# Patient Record
Sex: FEMALE | Race: WHITE | NOT HISPANIC OR LATINO | URBAN - METROPOLITAN AREA
[De-identification: names, ages, dates, MRNs, and addresses within clinical notes are randomized per-mention and may not be internally consistent; named-entity substitution may affect disease eponyms.]

---

## 2021-09-27 ENCOUNTER — HOSPITAL ENCOUNTER (OUTPATIENT)
Facility: CLINIC | Age: 21
Discharge: STILL ADMITTED OR ADMINISTRATIVE | End: 2021-09-27
Payer: COMMERCIAL

## 2022-12-27 ENCOUNTER — HOSPITAL ENCOUNTER (OUTPATIENT)
Facility: CLINIC | Age: 22
Discharge: HOME | End: 2022-12-27
Attending: HOSPITALIST
Payer: COMMERCIAL

## 2022-12-27 VITALS
TEMPERATURE: 97.9 F | HEART RATE: 97 BPM | OXYGEN SATURATION: 97 % | DIASTOLIC BLOOD PRESSURE: 79 MMHG | SYSTOLIC BLOOD PRESSURE: 118 MMHG

## 2022-12-27 DIAGNOSIS — N39.0 UTI (URINARY TRACT INFECTION) WITH PYURIA: Primary | ICD-10-CM

## 2022-12-27 LAB
BILIRUBIN, POC: NEGATIVE
BLOOD URINE, POC: ABNORMAL
CLARITY, POC: ABNORMAL
COLOR, POC: YELLOW
EXPIRATION DATE: ABNORMAL
EXPIRATION DATE: NORMAL
GLUCOSE URINE, POC: NEGATIVE
KETONES, POC: NEGATIVE
LEUKOCYTE EST, POC: ABNORMAL
Lab: ABNORMAL
Lab: NORMAL
NITRITE, POC: ABNORMAL
PH, POC: 6
POCT MANUFACTURER: ABNORMAL
POCT MANUFACTURER: NORMAL
PREGNANCY TEST URINE, POC: NEGATIVE
PROTEIN, POC: NEGATIVE
SPECIFIC GRAVITY, POC: 1.01
UROBILINOGEN, POC: 0.2

## 2022-12-27 PROCEDURE — 81025 URINE PREGNANCY TEST: CPT | Performed by: HOSPITALIST

## 2022-12-27 PROCEDURE — S9083 URGENT CARE CENTER GLOBAL: HCPCS | Performed by: HOSPITALIST

## 2022-12-27 PROCEDURE — 99213 OFFICE O/P EST LOW 20 MIN: CPT | Performed by: HOSPITALIST

## 2022-12-27 PROCEDURE — 87077 CULTURE AEROBIC IDENTIFY: CPT | Performed by: HOSPITALIST

## 2022-12-27 PROCEDURE — 87491 CHLMYD TRACH DNA AMP PROBE: CPT | Performed by: HOSPITALIST

## 2022-12-27 PROCEDURE — 81002 URINALYSIS NONAUTO W/O SCOPE: CPT | Performed by: HOSPITALIST

## 2022-12-27 RX ORDER — SULFAMETHOXAZOLE AND TRIMETHOPRIM 800; 160 MG/1; MG/1
1 TABLET ORAL 2 TIMES DAILY
Qty: 10 TABLET | Refills: 0 | Status: SHIPPED | OUTPATIENT
Start: 2022-12-27 | End: 2023-01-01

## 2022-12-27 NOTE — ED PROVIDER NOTES
History  Chief Complaint   Patient presents with   • Urinary Frequency     HPI     22-year-old female was last menstrual cycle was 1 week ago.  Since over the past 6 weeks patient has been having urinary symptoms mostly frequency pressure.  Patient has been drinking lots of fluid.  However over the past 3 days patient's symptoms return with back pain no fever no chills.  Patient is sexually active denies any discharge at this time    No past medical history on file.    No past surgical history on file.    No family history on file.         Review of Systems  Constitutional well developed well nourished no fever no chills  HEENT: No visual changes no hearing changes no throat pain no jaw pain  Cardiovascular: No chest pain no palpitation  Respiratory: No shortness of breath no diaphoresis no PND  Gastrointestinal: No abdominal pain no constipation no diarrhea no nausea vomiting no weight loss  Genitourinary:has  urinary frequency has dysuria no hematuria  Musculoskeletal: No neck pain has back pain   Neuro: No dizziness no headache no ataxia  Physical Exam  ED Triage Vitals [12/27/22 1205]   Temp Heart Rate Resp BP SpO2   36.6 °C (97.9 °F) 97 -- 118/79 97 %      Temp Source Heart Rate Source Patient Position BP Location FiO2 (%) (Set)   Oral -- Sitting Left upper arm --       Physical Exam    HEENT PE RRL, no nystagmus, no icteric sclerae, good EOM  Heart RRR S1-S2 no murmurs  Lungs CTA no wheezing no rales no retractions no rhonchi  Extremities good pulses, no edema, good range of motion,  Abdomen  suprapubic tenderness negative for CVA tenderness bowel sounds present  Procedures  Procedures  Urinalysis showed UTI  UC Course     UTI  MDM      Discharged home good and stable  Bactrim 1 tablet twice a day for 5 days  Drink lots of fluid  Encourage proper perineal hygiene  Follow-up with Dr. Fountain if no better in 1 week       Phi Llamas MD  12/27/22 1236       Phi Llamas MD  12/27/22 131

## 2022-12-27 NOTE — DISCHARGE INSTRUCTIONS
Bactrim 1 tablet twice a day for 5 days  Drink lots of fluid  Encourage proper perineal hygiene  Follow-up with Dr. Fountain if no better in 1 week

## 2022-12-28 LAB
C TRACH RRNA SPEC QL NAA+PROBE: NEGATIVE
N GONORRHOEA RRNA SPEC QL NAA+PROBE: NEGATIVE

## 2022-12-29 LAB
BACTERIA UR CULT: ABNORMAL
BACTERIA UR CULT: ABNORMAL

## 2022-12-29 NOTE — RESULT ENCOUNTER NOTE
12/28/22 9:00 PM  Spoke with pt via cell, relayed negative gonorrhea & chlamydia results. Also discused positive urine culture (>=100,000 cfu/mL E.coli), Rx Bactrim DS 1 tablet BID x 5 days which pt is taking and tolerating well, reports improvement of symptoms. F/U per d/c instructions and urged to seek medical attention for new/worsening/persistent symptoms.

## 2023-01-26 ENCOUNTER — HOSPITAL ENCOUNTER (OUTPATIENT)
Facility: CLINIC | Age: 23
Discharge: HOME | End: 2023-01-26
Attending: INTERNAL MEDICINE
Payer: COMMERCIAL

## 2023-01-26 VITALS
DIASTOLIC BLOOD PRESSURE: 74 MMHG | RESPIRATION RATE: 18 BRPM | OXYGEN SATURATION: 96 % | SYSTOLIC BLOOD PRESSURE: 122 MMHG | TEMPERATURE: 98.6 F | HEART RATE: 82 BPM

## 2023-01-26 DIAGNOSIS — R39.9 UTI SYMPTOMS: Primary | ICD-10-CM

## 2023-01-26 LAB
BACTERIA, POC: 0
BILIRUBIN, POC: NEGATIVE
BLOOD URINE, POC: ABNORMAL
CLARITY, POC: ABNORMAL
COLOR, POC: YELLOW
EXPIRATION DATE: ABNORMAL
EXPIRATION DATE: NORMAL
GLUCOSE URINE, POC: NEGATIVE
KETONES, POC: POSITIVE
LEUKOCYTE EST, POC: ABNORMAL
Lab: ABNORMAL
Lab: NORMAL
NITRITE, POC: NEGATIVE
PH, POC: 5
POCT MANUFACTURER: ABNORMAL
POCT MANUFACTURER: NORMAL
PREGNANCY TEST URINE, POC: NEGATIVE
PROTEIN, POC: ABNORMAL
SPECIFIC GRAVITY, POC: 1.01
UROBILINOGEN, POC: 0.2

## 2023-01-26 PROCEDURE — 99213 OFFICE O/P EST LOW 20 MIN: CPT | Performed by: NURSE PRACTITIONER

## 2023-01-26 PROCEDURE — S9083 URGENT CARE CENTER GLOBAL: HCPCS | Performed by: NURSE PRACTITIONER

## 2023-01-26 PROCEDURE — 81025 URINE PREGNANCY TEST: CPT | Performed by: NURSE PRACTITIONER

## 2023-01-26 PROCEDURE — 81002 URINALYSIS NONAUTO W/O SCOPE: CPT | Performed by: NURSE PRACTITIONER

## 2023-01-26 RX ORDER — CEFUROXIME AXETIL 250 MG/1
250 TABLET ORAL 2 TIMES DAILY
Qty: 20 TABLET | Refills: 0 | Status: SHIPPED | OUTPATIENT
Start: 2023-01-26 | End: 2023-02-05

## 2023-01-26 NOTE — ED PROVIDER NOTES
[facility]  Urgent Care, Occupational Health, & Travel Medicine  Emergency Medicine/Urgent Care Note  Mya Velez, BOB, CRNP, FNP-BC  Nurse Practitioner    HISTORY OF PRESENT ILLNESS     Patient presenting today for:    UTI symptoms.  • Patient reports frequency, urgency, burning w/ urination - she just had a UTI in 12/2022 and was treated with 5-day course of Bactrim DS. She states s/s resolved but then returned shortly after - less bothersome in regards to severity.   • States she took the full course of antibiotic without any missed doses.     Denies:  • Abdominal pain, pelvic pain, suprapubic pain.   • Drainage or discharge from urethra or vaginal areas.   • Hematuria or urine discoloration.   • Flank pain or back pain.  • Malodorous urine.   • Fever, chills, body aches.   • Systemic s/s of infection.   • N/v/d/c, bowel pattern changes.    OTHER INFO:  • The patient has not used anything for treatment so far.   • No chance of pregnancy.   • Reports no chance of STI/STD.         PAST HISTORY     Reviewed from Nursing Triage:         No past medical history on file.    No past surgical history on file.    No family history on file.          REVIEW OF SYSTEMS     • Constitutional: No fever, chills. No aches. No fatigue. No systemic s/s of infection.  • Respiratory: No cough, SOB, HASTINGS, wheezing.   • Cardiovascular: No CP, palpitations, edema, activity intolerance.  • Gastrointestinal: No abdominal pain, bowel pattern changes, n/v/d/c. No GERD at this time.  • Genitourinary: + frequency, urgency, burning.  • Musculoskeletal: Denies body aches/myalgias. No back pain/flank pain.   • Skin: No associated skin changes.  • Hematological: Negative for adenopathy. No bldg noted in urine.        VITALS     ED Vitals    Date/Time Temp Pulse Resp BP SpO2 [facility]   01/26/23 1122 37 °C (98.6 °F) 82 18 122/74 96 % MM             PHYSICAL EXAM     General  • NAD. Appears comfortable, relaxed. No acute pain. Well-groomed. BMI  noted. See VS/Trends.   • No fever, chills, tremors.    Chest/Respiratory  • No SOB/HASTINGS. POX remains stable on RA. RR WNL.     Cardiovascular  • AVSS. No CP, palpitations, SOB, HASTINGS. NO EDEMA.     GI/  • No abdominal pain. No guarding. No rigidity.   • No flank pain.   • No pelvic pain.   • ABS x 4 quads. Abdomen is soft and nontender. Non-distended.   • URINE POCT: See A/P.     MSK  • No flank pain or back pain on exam/palpation.     Skin/Integ  • No bruising, erythema, rashes on the flank. No rashes or dry skin.        PROCEDURES     Procedures     DATA     Results     None          No orders to display       Scoring tools                                    MDM / ED COURSE / CLINICAL IMPRESSION / DISPO     Clinical Impression      UTI symptoms       MDM:    See HPI/ROS/PE.   · UA POCT done. Indicative of possible or suspected UTI.   · Will send urine sample out for C&S. Based on susceptibility chart/urine culture results, regimen will be altered if/when needed.   · RX: Ceftin 250 mg BID x 10 days (opted for longer regimen this time). Patient knows to take each dose with food and if she feels her symptoms have fully resolved by day 7, she will d/c antibiotic use at that time.   · Recommended thorough hand hygiene, perineal care/hygiene, and monitoring for more acute and dangerous s/s (I.e. Fever, chills, myalgias, flank pain, abdominal pain, nausea, vomiting, etc).  · Reviewed red-flag symptoms and indications to present to the ER (for worsening) or PCP (for lack of improvement, worsening, and general follow-up) for further evaluation.    · Knows to follow-up with PCP as well due to persistent nature of UTI symptoms despite antibiotic use.         Disposition  Discharge. Strict return precautions reviewed.              Mya Velez, BOB  01/26/23 1482

## 2023-01-26 NOTE — ED ATTESTATION NOTE
I was immediately available to provide supervision and direction for the care of the patient.     Mckay Kunz DO  01/26/23 151

## 2023-01-28 LAB
BACTERIA UR CULT: NO GROWTH
BACTERIA UR CULT: NORMAL

## 2023-03-20 ENCOUNTER — OFFICE VISIT (OUTPATIENT)
Dept: OBSTETRICS AND GYNECOLOGY | Facility: CLINIC | Age: 23
End: 2023-03-20
Payer: COMMERCIAL

## 2023-03-20 VITALS
SYSTOLIC BLOOD PRESSURE: 118 MMHG | WEIGHT: 145.6 LBS | BODY MASS INDEX: 23.4 KG/M2 | DIASTOLIC BLOOD PRESSURE: 68 MMHG | HEIGHT: 66 IN

## 2023-03-20 DIAGNOSIS — N92.0 MENORRHAGIA WITH REGULAR CYCLE: ICD-10-CM

## 2023-03-20 DIAGNOSIS — N94.6 DYSMENORRHEA: ICD-10-CM

## 2023-03-20 DIAGNOSIS — Z12.4 ENCOUNTER FOR PAP SMEAR OF CERVIX WITH HPV DNA COTESTING: ICD-10-CM

## 2023-03-20 DIAGNOSIS — Z01.419 WOMEN'S ANNUAL ROUTINE GYNECOLOGICAL EXAMINATION: Primary | ICD-10-CM

## 2023-03-20 PROBLEM — J45.990 EXERCISE-INDUCED ASTHMA: Status: ACTIVE | Noted: 2023-03-20

## 2023-03-20 PROCEDURE — S0610 ANNUAL GYNECOLOGICAL EXAMINA: HCPCS | Performed by: OBSTETRICS & GYNECOLOGY

## 2023-03-20 RX ORDER — ALBUTEROL SULFATE 90 UG/1
2 INHALANT RESPIRATORY (INHALATION) EVERY 6 HOURS PRN
COMMUNITY

## 2023-03-20 RX ORDER — NORGESTIMATE AND ETHINYL ESTRADIOL 7DAYSX3 LO
1 KIT ORAL DAILY
Qty: 84 TABLET | Refills: 3 | Status: SHIPPED | OUTPATIENT
Start: 2023-03-20 | End: 2023-09-25 | Stop reason: ALTCHOICE

## 2023-03-20 ASSESSMENT — ENCOUNTER SYMPTOMS
CHEST TIGHTNESS: 0
PHOTOPHOBIA: 0
HEMATOLOGIC/LYMPHATIC NEGATIVE: 1
FLANK PAIN: 0
GASTROINTESTINAL NEGATIVE: 1
HEADACHES: 0
PALPITATIONS: 0
DECREASED CONCENTRATION: 0
WHEEZING: 0
SLEEP DISTURBANCE: 0
BACK PAIN: 0
SORE THROAT: 0
COUGH: 0
DIFFICULTY URINATING: 0
DEPRESSION: 0
WEAKNESS: 0
SHORTNESS OF BREATH: 0
ALLERGIC/IMMUNOLOGIC NEGATIVE: 1
TROUBLE SWALLOWING: 0
CONSTITUTIONAL NEGATIVE: 1
JOINT SWELLING: 0
LIGHT-HEADEDNESS: 0
DYSURIA: 0
SINUS PRESSURE: 0
ENDOCRINE NEGATIVE: 1
HEMATURIA: 0
NERVOUS/ANXIOUS: 0
EYE REDNESS: 0
MYALGIAS: 0
FREQUENCY: 0

## 2023-03-20 NOTE — PROGRESS NOTES
"3/20/2023  Nayeli Rosalinda is a 23 y.o. female who presents for annual exam.   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 Dysmenorrhea:mild, occurring first 1-2 days of flow. Cyclic symptoms include none. No intermenstrual bleeding, spotting, or discharge.  First 2-3 days are heavy and changes tampon every 2-3 hours  Takes Ibuprofen for cramps; takes 400 mg every   4-6 hours     The patient is sexually active. GYN screening history: no prior history of gyn screening tests. Domestic violence: no.     Current contraception: condoms  History of abnormal Pap smear: no  Family history of uterine or ovarian cancer: no  Regular self breast exam: no  History of abnormal mammogram: no  Family history of breast cancer: no  History of abnormal lipids: no  Menstrual History:  OB History        0    Para   0    Term   0       0    AB   0    Living   0       SAB   0    IAB   0    Ectopic   0    Multiple   0    Live Births   0                Patient's last menstrual period was 2023 (approximate).         Review of Systems and Physical Exam  The following have been reviewed and updated as appropriate in this visit:  Allergies  Meds  Problems       Visit Vitals  /68   Ht 1.676 m (5' 6\")   Wt 66 kg (145 lb 9.6 oz)   LMP 2023 (Approximate)   BMI 23.50 kg/m²     HPI  Review of Systems   Constitutional: Negative.    HENT: Negative for sinus pressure, sneezing, sore throat and trouble swallowing.    Eyes: Negative for photophobia, redness and visual disturbance.   Respiratory: Negative for cough, chest tightness, shortness of breath and wheezing.    Cardiovascular: Negative for chest pain, palpitations and leg swelling.   Gastrointestinal: Negative.    Endocrine: Negative.    Genitourinary: Negative for decreased urine volume, difficulty urinating, dyspareunia, " dysuria, flank pain, frequency, genital sores, hematuria, irregular menses, nocturia, pain with intercourse, pelvic pain, sexual dysfunction, urgency, vaginal bleeding, vaginal discharge, vaginal itching and vaginal pain.   Breast: Negative.   Musculoskeletal: Negative for back pain, joint swelling and myalgias.   Skin: Negative.    Allergic/Immunologic: Negative.    Neurological: Negative for weakness, light-headedness and headaches.   Hematological: Negative.    Psychiatric/Behavioral: Negative for decreased concentration, depression, sleep disturbance and suicidal ideas. The patient is not nervous/anxious.        Physical Exam  Constitutional:       General: She is not in acute distress.     Appearance: Normal appearance. She is well-developed. She is not diaphoretic.   Genitourinary:      Pelvic exam was performed with patient in the lithotomy position.      Urethra, vagina, cervix, uterus and urethral meatus normal.      No labial rash or lesion.      There is no tenderness, injury, Bartholin's cyst or lesion on the right labia.      There is no tenderness, injury or Bartholin's cyst on the left labia.    No urethral prolapse, tenderness, diverticulum or discharge present.      No vaginal discharge.      Uterus is anteverted and regular.   Pelvic exam was performed with patient in lithotomy exam position.     External female genitalia normal. No signs of erythema or atrophy. There is no right labia majora lesion or labia minora lesion. There is no left labia majora lesion or labia minora lesion. There is no lesion, edema, tenderness, injury or Bartholin's cyst on the right external genitalia. There is no edema, tenderness, injury or Bartholin's cyst on the left external genitalia.   Urethral meatus normal. There is no urethral meatus prolapse, lesion, tenderness or bleeding.   Urethra normal. There is no urethral discharge, erythema, tenderness, urethrocele or urethral diverticulum. The urethra is not everted.  [place]'s glands normal.   Normal bladder.   Vagina normal.   Cervix exam normal.  Uterus is normal. Uterine contour is regular. Uterus is anteverted.   Adnexa normal. HENT:      Head: Normocephalic and atraumatic.      Nose: Nose normal.   Eyes:      General: No scleral icterus.        Right eye: No discharge.         Left eye: No discharge.      Conjunctiva/sclera: Conjunctivae normal.      Pupils: Pupils are equal, round, and reactive to light.   Neck:      Thyroid: No thyromegaly.   Cardiovascular:      Rate and Rhythm: Normal rate and regular rhythm.      Heart sounds: Normal heart sounds. No murmur heard.     No friction rub. No gallop.   Pulmonary:      Effort: Pulmonary effort is normal. No respiratory distress.      Breath sounds: Normal breath sounds. No wheezing or rales.   Chest:      Chest wall: No tenderness.   Breasts:     Breasts are symmetrical.      Right: Normal. No inverted nipple, mass, nipple discharge, skin change or tenderness.      Left: Normal. No inverted nipple, mass, nipple discharge, skin change or tenderness.   Abdominal:      General: Bowel sounds are normal. There is no distension.      Palpations: Abdomen is soft. There is no mass.      Tenderness: There is no abdominal tenderness. There is no right CVA tenderness, left CVA tenderness, guarding or rebound.      Hernia: No hernia is present.   Musculoskeletal:         General: No tenderness or deformity. Normal range of motion.      Cervical back: Normal range of motion and neck supple.   Lymphadenopathy:      Cervical: No cervical adenopathy.      Upper Body:      Right upper body: No supraclavicular, axillary or pectoral adenopathy.      Left upper body: No supraclavicular, axillary or pectoral adenopathy.   Neurological:      Mental Status: She is alert and oriented to person, place, and time.      Cranial Nerves: No cranial nerve deficit.   Skin:     General: Skin is warm and dry.      Coloration: Skin is not pale.      Findings:  No erythema or rash.   Psychiatric:         Behavior: Behavior normal.   Vitals reviewed.          Diagnoses and all orders for this visit:    Women's annual routine gynecological examination    Encounter for Pap smear of cervix with HPV DNA cotesting  -     Cytology, Thinprep Pap    Dysmenorrhea  -     norgestimate-ethinyl estradioL (ORTHO TRI-CYCLEN LO) 0.18/0.215/0.25 mg-25 mcg per tablet; Take 1 tablet by mouth daily.    Menorrhagia with regular cycle  -     norgestimate-ethinyl estradioL (ORTHO TRI-CYCLEN LO) 0.18/0.215/0.25 mg-25 mcg per tablet; Take 1 tablet by mouth daily.    .counseled how to start and use OCPs as well as risks and benefits  Await pap smear results..  Return in about 3 months (around 6/20/2023) for medication follow up.  Gina Forman MD

## 2023-03-23 LAB
C TRACH RRNA CVX QL NAA+PROBE: NEGATIVE
CYTOLOGIST CVX/VAG CYTO: NORMAL
CYTOLOGY CVX/VAG DOC CYTO: NORMAL
CYTOLOGY CVX/VAG DOC THIN PREP: NORMAL
DX ICD CODE: NORMAL
LAB CORP .: NORMAL
LAB CORP NOTE:: NORMAL
N GONORRHOEA RRNA CVX QL NAA+PROBE: NEGATIVE
OTHER STN SPEC: NORMAL
STAT OF ADQ CVX/VAG CYTO-IMP: NORMAL

## 2023-03-24 NOTE — RESULT ENCOUNTER NOTE
Pap smear reviewed and is normal ; cultures for gonorrhea and chlamydia are negative  Portal message sent

## 2023-06-22 ENCOUNTER — OFFICE VISIT (OUTPATIENT)
Dept: OBSTETRICS AND GYNECOLOGY | Facility: CLINIC | Age: 23
End: 2023-06-22
Payer: COMMERCIAL

## 2023-06-22 VITALS
WEIGHT: 145.4 LBS | SYSTOLIC BLOOD PRESSURE: 100 MMHG | BODY MASS INDEX: 23.37 KG/M2 | DIASTOLIC BLOOD PRESSURE: 80 MMHG | HEIGHT: 66 IN

## 2023-06-22 DIAGNOSIS — N89.8 VAGINAL DISCHARGE: ICD-10-CM

## 2023-06-22 DIAGNOSIS — B37.31 CANDIDIASIS OF VULVA: Primary | ICD-10-CM

## 2023-06-22 DIAGNOSIS — Z20.2 POSSIBLE EXPOSURE TO STD: ICD-10-CM

## 2023-06-22 DIAGNOSIS — N76.0 ACUTE VAGINITIS: ICD-10-CM

## 2023-06-22 PROCEDURE — 99214 OFFICE O/P EST MOD 30 MIN: CPT | Performed by: OBSTETRICS & GYNECOLOGY

## 2023-06-22 PROCEDURE — 3008F BODY MASS INDEX DOCD: CPT | Performed by: OBSTETRICS & GYNECOLOGY

## 2023-06-22 RX ORDER — FLUCONAZOLE 150 MG/1
TABLET ORAL
Qty: 2 TABLET | Refills: 1 | Status: SHIPPED | OUTPATIENT
Start: 2023-06-22

## 2023-06-22 RX ORDER — CLOTRIMAZOLE AND BETAMETHASONE DIPROPIONATE 10; .64 MG/G; MG/G
CREAM TOPICAL 2 TIMES DAILY
Qty: 60 G | Refills: 2 | Status: SHIPPED | OUTPATIENT
Start: 2023-06-22 | End: 2023-07-02

## 2023-06-22 ASSESSMENT — ENCOUNTER SYMPTOMS
NERVOUS/ANXIOUS: 0
JOINT SWELLING: 0
HEMATURIA: 0
TROUBLE SWALLOWING: 0
DYSURIA: 0
GASTROINTESTINAL NEGATIVE: 1
SORE THROAT: 0
WHEEZING: 0
HEADACHES: 0
DEPRESSION: 0
SINUS PRESSURE: 0
BACK PAIN: 0
ALLERGIC/IMMUNOLOGIC NEGATIVE: 1
DIFFICULTY URINATING: 0
EYE REDNESS: 0
CHEST TIGHTNESS: 0
ENDOCRINE NEGATIVE: 1
PHOTOPHOBIA: 0
COUGH: 0
CONSTITUTIONAL NEGATIVE: 1
HEMATOLOGIC/LYMPHATIC NEGATIVE: 1
MYALGIAS: 0
SHORTNESS OF BREATH: 0
SLEEP DISTURBANCE: 0
FREQUENCY: 0
PALPITATIONS: 0
FLANK PAIN: 0
WEAKNESS: 0
DECREASED CONCENTRATION: 0
LIGHT-HEADEDNESS: 0

## 2023-06-22 NOTE — PROGRESS NOTES
"Patient ID: Nayeli Tellez   : 2000  MRN: [id number]   Visit Date: 2023    Subjective   Nayeli Tellez is presenting today for Follow-up (F/u birth control.Doing well on oc's.possible yeast inf.)  started Tri Sprintec Lo; feels well and likes the pill; breakthrough spotting first 2 packs and now only bleeds at end of pack  No nausea ,  or leg cramps; has breast tenderness about 1 week before period; same as before pill    Last few days notes itching in vagina; she is living down the [facility] for the summer  Just Graduated from [employer]  Vital Signs for this encounter:   Visit Vitals  /80 (BP Location: Left upper arm, Patient Position: Sitting)   Ht 1.676 m (5' 6\")   Wt 66 kg (145 lb 6.4 oz)   LMP 2023   BMI 23.47 kg/m²       Obstetric History:   OB History    Para Term  AB Living   0 0 0 0 0 0   SAB IAB Ectopic Multiple Live Births   0 0 0 0 0     Past Medical History:  has no past medical history on file.  Past Surgical History:  has a past surgical history that includes Appendectomy.  Family History: family history is not on file.  Social History:   Social History     Tobacco Use   • Smoking status: Never   • Smokeless tobacco: Never   Substance Use Topics   • Alcohol use: Yes   • Drug use: Never     Medications:   Current Outpatient Medications:   •  albuterol HFA 90 mcg/actuation inhaler, Inhale 2 puffs every 6 (six) hours as needed for wheezing., Disp: , Rfl:   •  norgestimate-ethinyl estradioL (ORTHO TRI-CYCLEN LO) 0.18/0.215/0.25 mg-25 mcg per tablet, Take 1 tablet by mouth daily., Disp: 84 tablet, Rfl: 3    Allergies: is allergic to vancomycin analogues.       Review of Systems and Physical Exam  The following have been reviewed and updated as appropriate in this visit:       Visit Vitals  /80 (BP Location: Left upper arm, Patient Position: Sitting)   Ht 1.676 m (5' 6\")   Wt 66 kg (145 lb 6.4 oz)   LMP 2023   BMI 23.47 kg/m²       Review of " Systems   Constitutional: Negative.    HENT: Negative for sinus pressure, sneezing, sore throat and trouble swallowing.    Eyes: Negative for photophobia, redness and visual disturbance.   Respiratory: Negative for cough, chest tightness, shortness of breath and wheezing.    Cardiovascular: Negative for chest pain, palpitations and leg swelling.   Gastrointestinal: Negative.    Endocrine: Negative.    Genitourinary: Negative for decreased urine volume, difficulty urinating, dyspareunia, dysuria, flank pain, frequency, genital sores, hematuria, irregular menses, nocturia, pain with intercourse, pelvic pain, sexual dysfunction, urgency, vaginal bleeding, vaginal discharge, vaginal itching and vaginal pain.   Breast: Negative.   Musculoskeletal: Negative for back pain, joint swelling and myalgias.   Skin: Negative.    Allergic/Immunologic: Negative.    Neurological: Negative for weakness, light-headedness and headaches.   Hematological: Negative.    Psychiatric/Behavioral: Negative for decreased concentration, depression, sleep disturbance and suicidal ideas. The patient is not nervous/anxious.      Physical Exam  Constitutional:       Appearance: Normal appearance.   HENT:      Head: Normocephalic.      Mouth/Throat:      Mouth: Mucous membranes are moist.     Genitourinary:    Urethra, bladder, cervix, uterus, urethral meatus, external female genitalia and adnexa normal.   Pelvic exam was performed with patient in lithotomy exam position.   Labial rash present.   There is a right labia minora lesion present.   Vaginal discharge present.   Uterine contour is globular. Uterus is anteflexed.    Genitourinary Comments: Dark mole, raised center with irregular borders; 1 cm diam  Thick white vaginal discharge             Pulmonary:      Effort: Pulmonary effort is normal.   Abdominal:      General: Abdomen is flat. There is no distension.      Palpations: Abdomen is soft.      Tenderness: There is no abdominal tenderness.  There is no right CVA tenderness, left CVA tenderness or guarding.   Musculoskeletal:         General: No swelling or tenderness. Normal range of motion.   Neurological:      Mental Status: She is alert and oriented to person, place, and time.   Skin:     General: Skin is warm and dry.   Psychiatric:         Mood and Affect: Mood normal.         Behavior: Behavior normal.         Thought Content: Thought content normal.         Judgment: Judgment normal.   Vitals reviewed.         Diagnoses and all orders for this visit:    Candidiasis of vulva  -     fluconazole (DIFLUCAN) 150 mg tablet; Take one tablet now and repeat dose in 3 days  -     clotrimazole-betamethasone (LOTRISONE) 1-0.05 % cream; Apply topically 2 (two) times a day for 10 days. Apply to affected area twice daily for 10 days    Acute vaginitis  -     NuSwab Vaginitis Plus (VG+) [facility]    Vaginal discharge  -     NuSwab Vaginitis Plus (VG+) [facility]    Possible exposure to STD  -     NuSwab Vaginitis Plus (VG+) [facility]    .   I discussed  removal of mole on peroneum.  Reviewed procedure with Nayeli.  She will think things over and message me in the portal when she is ready to schedule removal  Followup in 1 year or PRN    Gina Forman MD

## 2023-06-26 DIAGNOSIS — B96.89 BACTERIAL VAGINOSIS: Primary | ICD-10-CM

## 2023-06-26 DIAGNOSIS — N76.0 BACTERIAL VAGINOSIS: Primary | ICD-10-CM

## 2023-06-26 LAB
A VAGINAE DNA VAG QL NAA+PROBE: ABNORMAL SCORE
BVAB2 DNA VAG QL NAA+PROBE: ABNORMAL SCORE
C ALBICANS DNA VAG QL NAA+PROBE: NEGATIVE
C GLABRATA DNA VAG QL NAA+PROBE: NEGATIVE
C TRACH DNA VAG QL NAA+PROBE: NEGATIVE
MEGA1 DNA VAG QL NAA+PROBE: ABNORMAL SCORE
N GONORRHOEA DNA VAG QL NAA+PROBE: NEGATIVE
T VAGINALIS DNA VAG QL NAA+PROBE: NEGATIVE

## 2023-06-26 RX ORDER — METRONIDAZOLE 500 MG/1
500 TABLET ORAL 2 TIMES DAILY
Qty: 14 TABLET | Refills: 0 | Status: SHIPPED | OUTPATIENT
Start: 2023-06-26 | End: 2023-07-03

## 2023-06-26 NOTE — RESULT ENCOUNTER NOTE
Cultures reviewed.  Positive for bacterial vaginosis.  Flagyl prescribed and portal message sent to Nayeli

## 2023-06-27 ENCOUNTER — TELEPHONE (OUTPATIENT)
Dept: OBSTETRICS AND GYNECOLOGY | Facility: CLINIC | Age: 23
End: 2023-06-27
Payer: COMMERCIAL

## 2023-06-27 NOTE — TELEPHONE ENCOUNTER
Patient sent a mychart note saying that she wanted to get the mole removed as discussed in her office visit(6/22). Do we do that in the office? Thanks

## 2023-09-08 ENCOUNTER — OFFICE VISIT (OUTPATIENT)
Dept: OBSTETRICS AND GYNECOLOGY | Facility: CLINIC | Age: 23
End: 2023-09-08
Payer: COMMERCIAL

## 2023-09-08 VITALS
WEIGHT: 142.4 LBS | SYSTOLIC BLOOD PRESSURE: 114 MMHG | HEIGHT: 66 IN | DIASTOLIC BLOOD PRESSURE: 70 MMHG | BODY MASS INDEX: 22.88 KG/M2

## 2023-09-08 DIAGNOSIS — N90.89 VULVAR LESION: Primary | ICD-10-CM

## 2023-09-08 PROCEDURE — 99999 PR OFFICE/OUTPT VISIT,PROCEDURE ONLY: CPT | Performed by: OBSTETRICS & GYNECOLOGY

## 2023-09-08 PROCEDURE — 56605 BIOPSY OF VULVA/PERINEUM: CPT | Performed by: OBSTETRICS & GYNECOLOGY

## 2023-09-08 NOTE — PATIENT INSTRUCTIONS
1.  Irrigate the incision area every time you go to the bathroom with warm soapy water, pat dry and apply Neosporin or bacitracin  2.  Wear a sanitary pad and change them frequently to keep them clean and dry  3.  You can use Dermoplast spray as needed for pain.  This is a topical anesthetic that you can buy in the drugstore.  You should be able to find it with the sunscreen as it is commonly used for sunburns  4.  Suture material will dissolve in about 3 weeks sometimes for  5.  I will call you with your pathology results in 1 week

## 2023-09-08 NOTE — PROCEDURES
Bx Vulva    Date/Time: 9/8/2023 10:33 AM    Performed by: Gina Forman MD  Authorized by: Gina Forman MD  Local anesthesia used: yes  Anesthesia: local infiltration    Anesthesia:  Local anesthesia used: yes  Local Anesthetic: lidocaine 1% without epinephrine  Anesthetic total: 2.5 mL    Sedation:  Patient sedated: no    Patient tolerance: patient tolerated the procedure well with no immediate complications  Comments: Nayeli presents today for removal of a perianal/vulvar lesion/mole.  This was noted at the time of her recent annual exam.  The lesion is about a half a centimeter in size dark brown with some raised tissue in the center and a slight irregular border.  Procedure, risks and complications explained.  Consent read and signed and all questions answered.  She was placed in the dorsolithotomy position and the area was prepped with alcohol and Betadine.  1% lidocaine without epi was injected using 2-1/2 cc total.  A 4 mm Hernandez punch was then used in the center of the lesion and this portion of the lesion was resected and placed in a specimen container the remaining borders were excised using the 11 blade knife and were added to the container with the specimen.  A figure-of-eight was placed at the base of the bed of the lesion.  The skin was reapproximated with 4-0 Vicryl in a running fashion.  The area was then cleaned with alcohol swabs and bacitracin ointment was applied.  EBL was minimal at 1 cc.  The suture line was hemostatic at completion of the repair.  Advised irrigation post void and bowel movement with soapy water followed by application of Neosporin or bacitracin followed by a clean sanitary pad.  I will see her in 2 weeks.  I will call her in 1 week with pathology

## 2023-09-12 LAB
DX ICD CODE: NORMAL
DX ICD CODE: NORMAL
PATH REPORT.COMMENTS IMP SPEC: NORMAL
PATH REPORT.FINAL DX SPEC: NORMAL
PATH REPORT.GROSS SPEC: NORMAL
PATH REPORT.SITE OF ORIGIN SPEC: NORMAL
PATHOLOGIST NAME: NORMAL
PAYMENT PROCEDURE: NORMAL

## 2023-09-20 ENCOUNTER — TELEPHONE (OUTPATIENT)
Dept: OBSTETRICS AND GYNECOLOGY | Facility: CLINIC | Age: 23
End: 2023-09-20
Payer: COMMERCIAL

## 2023-09-25 ENCOUNTER — OFFICE VISIT (OUTPATIENT)
Dept: OBSTETRICS AND GYNECOLOGY | Facility: CLINIC | Age: 23
End: 2023-09-25
Payer: COMMERCIAL

## 2023-09-25 VITALS
HEIGHT: 66 IN | SYSTOLIC BLOOD PRESSURE: 122 MMHG | DIASTOLIC BLOOD PRESSURE: 80 MMHG | BODY MASS INDEX: 23.14 KG/M2 | WEIGHT: 144 LBS

## 2023-09-25 DIAGNOSIS — B37.31 VAGINA, CANDIDIASIS: ICD-10-CM

## 2023-09-25 DIAGNOSIS — N94.6 DYSMENORRHEA: ICD-10-CM

## 2023-09-25 DIAGNOSIS — N89.8 VAGINAL DISCHARGE: ICD-10-CM

## 2023-09-25 DIAGNOSIS — N92.0 MENORRHAGIA WITH REGULAR CYCLE: ICD-10-CM

## 2023-09-25 DIAGNOSIS — L03.315 CELLULITIS OF PERINEUM: ICD-10-CM

## 2023-09-25 DIAGNOSIS — Z01.419 WOMEN'S ANNUAL ROUTINE GYNECOLOGICAL EXAMINATION: Primary | ICD-10-CM

## 2023-09-25 DIAGNOSIS — N76.0 ACUTE VAGINITIS: ICD-10-CM

## 2023-09-25 PROCEDURE — S0612 ANNUAL GYNECOLOGICAL EXAMINA: HCPCS | Performed by: OBSTETRICS & GYNECOLOGY

## 2023-09-25 PROCEDURE — 3008F BODY MASS INDEX DOCD: CPT | Performed by: OBSTETRICS & GYNECOLOGY

## 2023-09-25 RX ORDER — CEPHALEXIN 500 MG/1
500 CAPSULE ORAL 3 TIMES DAILY
Qty: 21 CAPSULE | Refills: 0 | Status: SHIPPED | OUTPATIENT
Start: 2023-09-25 | End: 2023-10-02

## 2023-09-25 RX ORDER — NORGESTIMATE AND ETHINYL ESTRADIOL 7DAYSX3 28
1 KIT ORAL DAILY
Qty: 84 TABLET | Refills: 3 | Status: SHIPPED | OUTPATIENT
Start: 2023-09-25 | End: 2024-09-24

## 2023-09-25 RX ORDER — FLUCONAZOLE 150 MG/1
TABLET ORAL
Qty: 2 TABLET | Refills: 1 | Status: SHIPPED | OUTPATIENT
Start: 2023-09-25

## 2023-09-25 ASSESSMENT — ENCOUNTER SYMPTOMS
WHEEZING: 0
DYSURIA: 0
NERVOUS/ANXIOUS: 0
PHOTOPHOBIA: 0
EYE REDNESS: 0
FREQUENCY: 0
BACK PAIN: 0
HEADACHES: 0
LIGHT-HEADEDNESS: 0
HEMATOLOGIC/LYMPHATIC NEGATIVE: 1
MYALGIAS: 0
JOINT SWELLING: 0
GASTROINTESTINAL NEGATIVE: 1
DIFFICULTY URINATING: 0
COUGH: 0
WEAKNESS: 0
ENDOCRINE NEGATIVE: 1
PALPITATIONS: 0
DEPRESSION: 0
HEMATURIA: 0
DECREASED CONCENTRATION: 0
TROUBLE SWALLOWING: 0
SORE THROAT: 0
CHEST TIGHTNESS: 0
ALLERGIC/IMMUNOLOGIC NEGATIVE: 1
SHORTNESS OF BREATH: 0
CONSTITUTIONAL NEGATIVE: 1
SLEEP DISTURBANCE: 0
FLANK PAIN: 0
SINUS PRESSURE: 0

## 2023-09-25 NOTE — PROGRESS NOTES
"2023  Nayeli Chavezlor is a 23 y.o. female who presents for problem visit  Mole removal in the office 23  Diagnosis:   VULVAR BIOPSY:   LENTIGINOUS COMPOUND MELANOCYTIC NEVUS  States incision area is tender    Chart reviewed  Issues finding pharmacy with her OCP; told her  is ending their contract and told her to get a different OCP  History of heavy menses and cramps  Bleeds at end of pill pack  Periods are regular every 28-30 days, lasting 5 days. Dysmenorrhea:none. Cyclic symptoms include none. No intermenstrual bleeding, spotting, or discharge.      Pap 3/2023 normal/ STD cultures negative      The patient is sexually active. GYN screening history: last pap: was normal. Domestic violence: no.     Current contraception: OCP (estrogen/progesterone)  History of abnormal Pap smear: no  Family history of uterine or ovarian cancer: MGM ovarian cancer  Regular self breast exam: no  History of abnormal mammogram: no  Family history of breast cancer: no  History of abnormal lipids: no  Menstrual History:  OB History        0    Para   0    Term   0       0    AB   0    Living   0       SAB   0    IAB   0    Ectopic   0    Multiple   0    Live Births   0                Patient's last menstrual period was 09/15/2023.         Review of Systems and Physical Exam  The following have been reviewed and updated as appropriate in this visit:       Visit Vitals  /80 (BP Location: Right upper arm, Patient Position: Sitting)   Ht 1.676 m (5' 6\")   Wt 65.3 kg (144 lb)   LMP 09/15/2023   BMI 23.24 kg/m²     HPI  Review of Systems   Constitutional: Negative.    HENT: Negative for sinus pressure, sneezing, sore throat and trouble swallowing.    Eyes: Negative for photophobia, redness and visual disturbance.   Respiratory: Negative for cough, chest tightness, shortness of breath and wheezing.    Cardiovascular: Negative for chest pain, palpitations and leg swelling.   Gastrointestinal: Negative.  "   Endocrine: Negative.    Genitourinary: Negative for decreased urine volume, difficulty urinating, dyspareunia, dysuria, flank pain, frequency, genital sores, hematuria, irregular menses, nocturia, pain with intercourse, pelvic pain, sexual dysfunction, urgency, vaginal bleeding, vaginal discharge, vaginal itching and vaginal pain.   Breast: Negative.   Musculoskeletal: Negative for back pain, joint swelling and myalgias.   Skin: Negative.    Allergic/Immunologic: Negative.    Neurological: Negative for weakness, light-headedness and headaches.   Hematological: Negative.    Psychiatric/Behavioral: Negative for decreased concentration, depression, sleep disturbance and suicidal ideas. The patient is not nervous/anxious.        Physical Exam  Constitutional:       Appearance: Normal appearance.   HENT:      Mouth/Throat:      Mouth: Mucous membranes are moist.     Genitourinary:    Urethra, bladder, cervix, uterus, urethral meatus and external female genitalia normal.   Pelvic exam was performed with patient in lithotomy exam position.      Vaginal discharge present.   Uterine contour is regular. Uterus is anteverted.    Genitourinary Comments: Erythema and tenderness = pink area  Suture = blue line           Pulmonary:      Effort: Pulmonary effort is normal.   Abdominal:      General: Abdomen is flat. There is no distension.      Palpations: There is no mass.      Tenderness: There is no abdominal tenderness. There is no right CVA tenderness, left CVA tenderness, guarding or rebound.      Hernia: No hernia is present.   Neurological:      Mental Status: She is alert and oriented to person, place, and time.   Psychiatric:         Behavior: Behavior normal.   Vitals reviewed.          Diagnoses and all orders for this visit:    Women's annual routine gynecological examination    Dysmenorrhea  -     norgestimate-ethinyl estradioL (ORTHO TRI-CYCLEN,TRINESSA) 0.18/0.215/0.25 mg-35 mcg (28) per tablet; Take 1 tablet by  mouth daily.    Menorrhagia with regular cycle  -     norgestimate-ethinyl estradioL (ORTHO TRI-CYCLEN,TRINESSA) 0.18/0.215/0.25 mg-35 mcg (28) per tablet; Take 1 tablet by mouth daily.    Cellulitis of perineum  -     cephalexin (KEFLEX) 500 mg capsule; Take 1 capsule (500 mg total) by mouth 3 (three) times a day for 7 days.    Vagina, candidiasis  -     fluconazole (DIFLUCAN) 150 mg tablet; Take one tablet now and repeat dose in 3 days    Acute vaginitis  -     Nuswab BV+Candida 6SP [facility]    Vaginal discharge  -     Nuswab BV+Candida 6SP [facility]    .  Breast self exam technique reviewed and patient encouraged to perform self-exam monthly..  No follow-ups on file.  Gina Forman MD

## 2023-09-26 LAB
A VAGINAE DNA VAG QL NAA+PROBE: NORMAL SCORE
BVAB2 DNA VAG QL NAA+PROBE: NORMAL SCORE
C ALBICANS DNA VAG QL NAA+PROBE: NEGATIVE
C GLABRATA DNA VAG QL NAA+PROBE: NEGATIVE
C KRUSEI DNA VAG QL NAA+PROBE: NEGATIVE
C LUSITANIAE DNA VAG QL NAA+PROBE: NEGATIVE
CANDIDA DNA VAG QL NAA+PROBE: NEGATIVE
MEGA1 DNA VAG QL NAA+PROBE: NORMAL SCORE

## 2024-02-13 ENCOUNTER — OFFICE VISIT (OUTPATIENT)
Dept: OBSTETRICS AND GYNECOLOGY | Facility: CLINIC | Age: 24
End: 2024-02-13
Payer: COMMERCIAL

## 2024-02-13 VITALS
SYSTOLIC BLOOD PRESSURE: 110 MMHG | HEIGHT: 66 IN | WEIGHT: 143.8 LBS | BODY MASS INDEX: 23.11 KG/M2 | DIASTOLIC BLOOD PRESSURE: 76 MMHG

## 2024-02-13 DIAGNOSIS — N89.8 VAGINAL DISCHARGE: Primary | ICD-10-CM

## 2024-02-13 PROCEDURE — 3008F BODY MASS INDEX DOCD: CPT | Performed by: ADVANCED PRACTICE MIDWIFE

## 2024-02-13 PROCEDURE — 99213 OFFICE O/P EST LOW 20 MIN: CPT | Performed by: ADVANCED PRACTICE MIDWIFE

## 2024-02-13 RX ORDER — SPIRONOLACTONE 100 MG/1
100 TABLET, FILM COATED ORAL DAILY
COMMUNITY

## 2024-02-13 NOTE — PROGRESS NOTES
"Patient ID: Nayeli Tellez    : 2000  MRN: [id number]     Visit Date: 2024    Subjective   Nayeli Tellez is presenting today for problem (C/o itching and odor.itching and odor)  For the past 2 weeks, has noticed vaginal discharge, slight itch/odor.  Recently started a probiotic gummy.  Requests STI vaginal screen, no new partners.     Vital Signs for this encounter:   Visit Vitals  /76 (BP Location: Left upper arm, Patient Position: Sitting)   Ht 1.676 m (5' 6\")   Wt 65.2 kg (143 lb 12.8 oz)   LMP 2024   BMI 23.21 kg/m²       Obstetric History:   OB History    Para Term  AB Living   0 0 0 0 0 0   SAB IAB Ectopic Multiple Live Births   0 0 0 0 0     Past Medical History:  has no past medical history on file.  Past Surgical History:  has a past surgical history that includes Appendectomy.  Family History: family history is not on file.  Social History:   Social History     Tobacco Use   • Smoking status: Never   • Smokeless tobacco: Never   Substance Use Topics   • Alcohol use: Yes   • Drug use: Never     Medications:   Current Outpatient Medications:   •  spironolactone (ALDACTONE) 100 mg tablet, Take 100 mg by mouth daily., Disp: , Rfl:   •  albuterol HFA 90 mcg/actuation inhaler, Inhale 2 puffs every 6 (six) hours as needed for wheezing., Disp: , Rfl:   •  fluconazole (DIFLUCAN) 150 mg tablet, Take one tablet now and repeat dose in 3 days (Patient not taking: Reported on 2023), Disp: 2 tablet, Rfl: 1  •  fluconazole (DIFLUCAN) 150 mg tablet, Take one tablet now and repeat dose in 3 days (Patient not taking: Reported on 2024), Disp: 2 tablet, Rfl: 1  •  norgestimate-ethinyl estradioL (ORTHO TRI-CYCLEN,TRINESSA) 0.18/0.215/0.25 mg-35 mcg (28) per tablet, Take 1 tablet by mouth daily. (Patient not taking: Reported on 2024), Disp: 84 tablet, Rfl: 3    Allergies: is allergic to vancomycin and vancomycin analogues.     HPI  Review of Systems   Genitourinary: " Positive for vaginal discharge.   All other systems reviewed and are negative.    Physical Exam  Constitutional:       Appearance: Normal appearance.   HENT:      Head: Normocephalic.     Genitourinary:    Urethra, bladder, cervix, urethral meatus and external female genitalia normal.   Pelvic exam was performed with patient in lithotomy exam position.        Vaginal discharge present.      Genitourinary Comments: Scant thin white discharge     Neurological:      Mental Status: She is alert and oriented to person, place, and time.   Skin:     General: Skin is warm and dry.   Psychiatric:         Mood and Affect: Mood normal.         Behavior: Behavior normal.         Thought Content: Thought content normal.         Judgment: Judgment normal.   Vitals reviewed.          Diagnoses and all orders for this visit:    Vaginal discharge (Primary)  -     NUSWAB VAGINITIS PLUS (VG+) [facility]     Will notify of results   Annual due in 3 months    Yin Dickinson CNM

## 2024-02-16 LAB
A VAGINAE DNA VAG QL NAA+PROBE: NORMAL SCORE
BVAB2 DNA VAG QL NAA+PROBE: NORMAL SCORE
C ALBICANS DNA VAG QL NAA+PROBE: NEGATIVE
C GLABRATA DNA VAG QL NAA+PROBE: NEGATIVE
C TRACH DNA VAG QL NAA+PROBE: NEGATIVE
MEGA1 DNA VAG QL NAA+PROBE: NORMAL SCORE
N GONORRHOEA DNA VAG QL NAA+PROBE: NEGATIVE
T VAGINALIS DNA VAG QL NAA+PROBE: NEGATIVE

## 2024-10-28 ENCOUNTER — OFFICE VISIT (OUTPATIENT)
Dept: OBSTETRICS AND GYNECOLOGY | Facility: CLINIC | Age: 24
End: 2024-10-28
Payer: COMMERCIAL

## 2024-10-28 VITALS — SYSTOLIC BLOOD PRESSURE: 100 MMHG | WEIGHT: 144 LBS | DIASTOLIC BLOOD PRESSURE: 74 MMHG | BODY MASS INDEX: 23.24 KG/M2

## 2024-10-28 DIAGNOSIS — N89.8 VAGINAL DISCHARGE: Primary | ICD-10-CM

## 2024-10-28 PROCEDURE — 0502F SUBSEQUENT PRENATAL CARE: CPT | Performed by: MIDWIFE

## 2024-10-28 NOTE — PROGRESS NOTES
Visit Date: 10/28/2024   Nayeli Rosalinda is 24 y.o. female presenting today for Vaginitis/Bacterial Vaginosis    Patient reports three weeks of vaginal discharge, yellowish/greenish discharge, thick and thin.     The following have been reviewed and updated as appropriate in this visit:       Visit Vitals  /74   Wt 65.3 kg (144 lb)   LMP 10/15/2024   BMI 23.24 kg/m²     Menstrual History:  OB History          0    Para   0    Term   0       0    AB   0    Living   0         SAB   0    IAB   0    Ectopic   0    Multiple   0    Live Births   0                Patient's last menstrual period was 10/15/2024.       Medications:   Current Outpatient Medications:     albuterol HFA 90 mcg/actuation inhaler, Inhale 2 puffs every 6 (six) hours as needed for wheezing., Disp: , Rfl:     fluconazole (DIFLUCAN) 150 mg tablet, Take one tablet now and repeat dose in 3 days (Patient not taking: Reported on 2023), Disp: 2 tablet, Rfl: 1    fluconazole (DIFLUCAN) 150 mg tablet, Take one tablet now and repeat dose in 3 days (Patient not taking: Reported on 2024), Disp: 2 tablet, Rfl: 1    norgestimate-ethinyl estradioL (ORTHO TRI-CYCLEN,TRINESSA) 0.18/0.215/0.25 mg-35 mcg (28) per tablet, Take 1 tablet by mouth daily. (Patient not taking: Reported on 2024), Disp: 84 tablet, Rfl: 3    spironolactone (ALDACTONE) 100 mg tablet, Take 100 mg by mouth daily., Disp: , Rfl:     Allergies: is allergic to vancomycin and vancomycin analogues.     Past Medical History:  has no past medical history on file.  Past Surgical History:  has a past surgical history that includes Appendectomy.  Family History: family history is not on file.  Social History:   Social History     Tobacco Use    Smoking status: Never    Smokeless tobacco: Never   Substance Use Topics    Alcohol use: Yes    Drug use: Never     The patient is sexually active. GYN screening history:     HPI  Review of Systems  Physical Exam    Genitourinary:     Vulva normal.      Genitourinary Comments: +white/yellow discharge        Diagnoses and all orders for this visit:    Vaginal discharge (Primary)        Marlena Galarza CNM

## 2024-10-30 LAB
A VAGINAE DNA VAG QL NAA+PROBE: NORMAL SCORE
BVAB2 DNA VAG QL NAA+PROBE: NORMAL SCORE
C ALBICANS DNA VAG QL NAA+PROBE: NEGATIVE
C GLABRATA DNA VAG QL NAA+PROBE: NEGATIVE
C TRACH DNA SPEC QL NAA+PROBE: NEGATIVE
MEGA1 DNA VAG QL NAA+PROBE: NORMAL SCORE
N GONORRHOEA DNA VAG QL NAA+PROBE: NEGATIVE
T VAGINALIS DNA VAG QL NAA+PROBE: NEGATIVE